# Patient Record
Sex: MALE | URBAN - METROPOLITAN AREA
[De-identification: names, ages, dates, MRNs, and addresses within clinical notes are randomized per-mention and may not be internally consistent; named-entity substitution may affect disease eponyms.]

---

## 2024-02-05 ENCOUNTER — TELEPHONE (OUTPATIENT)
Dept: SLEEP MEDICINE | Facility: HOSPITAL | Age: 89
End: 2024-02-05

## 2024-02-05 NOTE — TELEPHONE ENCOUNTER
Pt wife brought in his old mask which was held together by safety7 pins. Pt had a wisp mask med. We did not have a wisp mask here. Gave Pt a kun mask and let Pt know not to wait so long and to make sure to order his mask before it is in such a dire condition. Reminded Pt to do follow appt to get future supplies, since we would be unable to give him another mask.